# Patient Record
Sex: MALE | Race: WHITE | NOT HISPANIC OR LATINO | Employment: FULL TIME | URBAN - METROPOLITAN AREA
[De-identification: names, ages, dates, MRNs, and addresses within clinical notes are randomized per-mention and may not be internally consistent; named-entity substitution may affect disease eponyms.]

---

## 2021-01-26 ENCOUNTER — OFFICE VISIT (OUTPATIENT)
Dept: NEUROSURGERY | Facility: CLINIC | Age: 36
End: 2021-01-26

## 2021-01-26 VITALS
DIASTOLIC BLOOD PRESSURE: 106 MMHG | TEMPERATURE: 98.5 F | HEIGHT: 72 IN | BODY MASS INDEX: 35.76 KG/M2 | SYSTOLIC BLOOD PRESSURE: 136 MMHG | WEIGHT: 264 LBS

## 2021-01-26 DIAGNOSIS — M51.26 HERNIATED LUMBAR INTERVERTEBRAL DISC: Primary | ICD-10-CM

## 2021-01-26 PROCEDURE — 99203 OFFICE O/P NEW LOW 30 MIN: CPT | Performed by: NEUROLOGICAL SURGERY

## 2021-01-26 RX ORDER — NABUMETONE 750 MG/1
750 TABLET, FILM COATED ORAL 2 TIMES DAILY
Qty: 60 TABLET | Refills: 0 | Status: SHIPPED | OUTPATIENT
Start: 2021-01-26 | End: 2021-03-06 | Stop reason: HOSPADM

## 2021-01-26 RX ORDER — CYCLOBENZAPRINE HCL 10 MG
10 TABLET ORAL 2 TIMES DAILY PRN
COMMUNITY
End: 2021-03-06 | Stop reason: HOSPADM

## 2021-01-26 RX ORDER — METHOCARBAMOL 750 MG/1
750 TABLET, FILM COATED ORAL NIGHTLY
Qty: 30 TABLET | Refills: 1 | Status: SHIPPED | OUTPATIENT
Start: 2021-01-26 | End: 2021-02-25

## 2021-01-26 NOTE — PATIENT INSTRUCTIONS
Call Dr. Garcia on a Monday or Tuesday (ask for Nereida or Celina) and leave a message.     Dr. Garcia will call you back at the end of the day as soon as he can.     [phone] Nereida    [phone] Celina Abdi    Call 3-4 weeks

## 2021-02-15 ENCOUNTER — TELEPHONE (OUTPATIENT)
Dept: NEUROSURGERY | Facility: CLINIC | Age: 36
End: 2021-02-15

## 2021-02-15 ENCOUNTER — PREP FOR SURGERY (OUTPATIENT)
Dept: OTHER | Facility: HOSPITAL | Age: 36
End: 2021-02-15

## 2021-02-15 DIAGNOSIS — M51.26 HERNIATED LUMBAR INTERVERTEBRAL DISC: Primary | ICD-10-CM

## 2021-02-15 RX ORDER — HYDROCODONE BITARTRATE AND ACETAMINOPHEN 7.5; 325 MG/1; MG/1
1 TABLET ORAL ONCE
Status: CANCELLED | OUTPATIENT
Start: 2021-02-15 | End: 2021-02-15

## 2021-02-15 RX ORDER — CEFAZOLIN SODIUM 2 G/100ML
2 INJECTION, SOLUTION INTRAVENOUS ONCE
Status: CANCELLED | OUTPATIENT
Start: 2021-02-15 | End: 2021-02-15

## 2021-02-15 RX ORDER — SODIUM CHLORIDE 0.9 % (FLUSH) 0.9 %
3-10 SYRINGE (ML) INJECTION AS NEEDED
Status: CANCELLED | OUTPATIENT
Start: 2021-02-15

## 2021-02-15 RX ORDER — IBUPROFEN 800 MG/1
800 TABLET ORAL ONCE
Status: CANCELLED | OUTPATIENT
Start: 2021-02-15 | End: 2021-02-15

## 2021-02-15 RX ORDER — ACETAMINOPHEN 325 MG/1
650 TABLET ORAL ONCE
Status: CANCELLED | OUTPATIENT
Start: 2021-02-15 | End: 2021-02-15

## 2021-02-15 RX ORDER — SODIUM CHLORIDE, SODIUM LACTATE, POTASSIUM CHLORIDE, CALCIUM CHLORIDE 600; 310; 30; 20 MG/100ML; MG/100ML; MG/100ML; MG/100ML
100 INJECTION, SOLUTION INTRAVENOUS CONTINUOUS
Status: CANCELLED | OUTPATIENT
Start: 2021-02-15

## 2021-02-15 RX ORDER — SODIUM CHLORIDE 0.9 % (FLUSH) 0.9 %
3 SYRINGE (ML) INJECTION EVERY 12 HOURS SCHEDULED
Status: CANCELLED | OUTPATIENT
Start: 2021-02-15

## 2021-02-15 NOTE — H&P
Chief Complaint   Patient presents with   • Back Pain   • Leg Pain       RLE         HISTORY OF PRESENT ILLNESS: This is a 35-year-old male seen with approximately 5-week history of pain in his back which radiates to his right leg subsequent playing basketball i on January 20, 2001.  He has shown no improvement since that time.  The pain is worse with bending and squatting.  He has a genetic predisposition for degenerative disc disease.  Lumbar MRI has been performed.     Medical History   History reviewed. No pertinent past medical history.        Surgical History         Past Surgical History:   Procedure Laterality Date   • ROTATOR CUFF REPAIR Right 1997                 Family History   Problem Relation Age of Onset   • Arthritis Mother           Social History   Social History            Socioeconomic History   • Marital status:        Spouse name: Not on file   • Number of children: Not on file   • Years of education: Not on file   • Highest education level: Not on file   Tobacco Use   • Smoking status: Never Smoker   • Smokeless tobacco: Never Used   Substance and Sexual Activity   • Alcohol use: Yes       Alcohol/week: 10.0 - 15.0 standard drinks       Types: 10 - 15 Cans of beer per week   • Drug use: Never   • Sexual activity: Defer           No Known Allergies        Current Outpatient Medications:   •  cyclobenzaprine (FLEXERIL) 10 MG tablet, Take 10 mg by mouth 2 (Two) Times a Day As Needed for Muscle Spasms., Disp: , Rfl:      Review of Systems   Constitutional: Negative for activity change, appetite change, chills, diaphoresis, fatigue, fever and unexpected weight change.   HENT: Negative for congestion, dental problem, drooling, ear discharge, ear pain, facial swelling, hearing loss, mouth sores, nosebleeds, postnasal drip, rhinorrhea, sinus pressure, sneezing, sore throat, tinnitus, trouble swallowing and voice change.    Eyes: Negative for photophobia, pain, discharge, redness, itching and  "visual disturbance.   Respiratory: Negative for apnea, cough, choking, chest tightness, shortness of breath, wheezing and stridor.    Cardiovascular: Negative for chest pain, palpitations and leg swelling.   Gastrointestinal: Negative for abdominal distention, abdominal pain, anal bleeding, blood in stool, constipation, diarrhea, nausea, rectal pain and vomiting.   Endocrine: Negative for cold intolerance, heat intolerance, polydipsia, polyphagia and polyuria.   Genitourinary: Negative for decreased urine volume, difficulty urinating, dysuria, enuresis, flank pain, frequency, genital sores, hematuria and urgency.   Musculoskeletal: Positive for back pain. Negative for arthralgias, gait problem, joint swelling, myalgias, neck pain and neck stiffness.   Skin: Negative for color change, pallor, rash and wound.   Allergic/Immunologic: Negative for environmental allergies, food allergies and immunocompromised state.   Neurological: Negative for dizziness, tremors, seizures, syncope, facial asymmetry, speech difficulty, weakness, light-headedness, numbness and headaches.   Hematological: Negative for adenopathy. Does not bruise/bleed easily.   Psychiatric/Behavioral: Negative for agitation, behavioral problems, confusion, decreased concentration, dysphoric mood, hallucinations, self-injury, sleep disturbance and suicidal ideas. The patient is not nervous/anxious and is not hyperactive.    All other systems reviewed and are negative.        Vitals       Vitals:     01/26/21 1224   BP: (!) 136/106   BP Location: Right arm   Patient Position: Sitting   Cuff Size: Adult   Temp: 98.5 °F (36.9 °C)   TempSrc: Infrared   Weight: 120 kg (264 lb)   Height: 182.9 cm (72\")           Neurological Examination:     Mental status/speech: The patient is alert and oriented.  Speech is clear without aphysia or dysarthria.  No overt cognitive deficits.     Cranial nerve examination:     Olfaction: Smell is intact.  Vision: Vision is intact " without visual field abnormalities.  Funduscopic examination is normal.  No pupillary irregularity.  Ocular motor examination: The extraocular muscles are intact.  There is no diplopia.  The pupil is round and reactive to both light and accommodation.  There is no nystagmus.  Facial movement/sensation: There is no facial weakness.  Sensation is intact in the first, second, and third divisions of the trigeminal nerve.  The corneal reflex is intact.  Auditory: Hearing is intact to finger rub bilaterally.  Cranial nerves IX, X, XI, XII: Phonation is normal.  No dysphagia.  Tongue is protruded in the midline without atrophy.  The gag reflex is intact.  Shoulder shrug is normal.     Musculoligamentous ligamentous examination: BMI 35.8, weight 264.  Limited range of motion lumbar spine.  Positive straight leg raise on the right side.  No weakness, sensory loss or reflex asymmetry.  Gait is normal.  No Babinski Aleshia or clonus     Medical Decision Making:                 Diagnostic Data Set: Lumbar MRI data set show the presence of degenerative disc disease L4-5 and L5-S1.  At L5-S1 he has disc protrusion deviating to the right providing anatomical/clinical correlation.                             Assessment: Symptomatic disc herniation L5-S1, right                                                                            Recommendations: I have sent him to physical therapy, given him prescription of Relafen 750 mg twice daily and Robaxin-750 milligrams at night and he will call me the next 3-4 weeks.  At that time if he is not better either epidural steroid injection or disc excision would be indicated.  He is unable to work at this time.  I will keep you informed and thank you for allow me to see him

## 2021-02-15 NOTE — TELEPHONE ENCOUNTER
----- Message from Nereida Pablo sent at 2/15/2021 12:24 PM EST -----  Completed 3 weeks of PT. This has made things worse. He does not have any improvement.    [phone]

## 2021-02-22 PROBLEM — M51.26 HERNIATED LUMBAR INTERVERTEBRAL DISC: Status: ACTIVE | Noted: 2021-02-22

## 2021-03-01 ENCOUNTER — APPOINTMENT (OUTPATIENT)
Dept: PREADMISSION TESTING | Facility: HOSPITAL | Age: 36
End: 2021-03-01

## 2021-03-03 ENCOUNTER — APPOINTMENT (OUTPATIENT)
Dept: PREADMISSION TESTING | Facility: HOSPITAL | Age: 36
End: 2021-03-03

## 2021-03-03 VITALS — BODY MASS INDEX: 36.73 KG/M2 | HEIGHT: 72 IN | WEIGHT: 271.2 LBS

## 2021-03-03 DIAGNOSIS — M51.26 HERNIATED LUMBAR INTERVERTEBRAL DISC: ICD-10-CM

## 2021-03-03 LAB
ALBUMIN SERPL-MCNC: 4.7 G/DL (ref 3.5–5.2)
ALBUMIN/GLOB SERPL: 2.4 G/DL
ALP SERPL-CCNC: 79 U/L (ref 39–117)
ALT SERPL W P-5'-P-CCNC: 48 U/L (ref 1–41)
ANION GAP SERPL CALCULATED.3IONS-SCNC: 8 MMOL/L (ref 5–15)
AST SERPL-CCNC: 26 U/L (ref 1–40)
BILIRUB SERPL-MCNC: 0.5 MG/DL (ref 0–1.2)
BILIRUB UR QL STRIP: NEGATIVE
BUN SERPL-MCNC: 12 MG/DL (ref 6–20)
BUN/CREAT SERPL: 12 (ref 7–25)
CALCIUM SPEC-SCNC: 9.5 MG/DL (ref 8.6–10.5)
CHLORIDE SERPL-SCNC: 104 MMOL/L (ref 98–107)
CLARITY UR: CLEAR
CO2 SERPL-SCNC: 29 MMOL/L (ref 22–29)
COLOR UR: YELLOW
CREAT SERPL-MCNC: 1 MG/DL (ref 0.76–1.27)
DEPRECATED RDW RBC AUTO: 35.6 FL (ref 37–54)
ERYTHROCYTE [DISTWIDTH] IN BLOOD BY AUTOMATED COUNT: 11.6 % (ref 12.3–15.4)
GFR SERPL CREATININE-BSD FRML MDRD: 85 ML/MIN/1.73
GLOBULIN UR ELPH-MCNC: 2 GM/DL
GLUCOSE SERPL-MCNC: 99 MG/DL (ref 65–99)
GLUCOSE UR STRIP-MCNC: NEGATIVE MG/DL
HCT VFR BLD AUTO: 43.2 % (ref 37.5–51)
HGB BLD-MCNC: 15.3 G/DL (ref 13–17.7)
HGB UR QL STRIP.AUTO: NEGATIVE
KETONES UR QL STRIP: NEGATIVE
LEUKOCYTE ESTERASE UR QL STRIP.AUTO: NEGATIVE
MCH RBC QN AUTO: 30.1 PG (ref 26.6–33)
MCHC RBC AUTO-ENTMCNC: 35.4 G/DL (ref 31.5–35.7)
MCV RBC AUTO: 85 FL (ref 79–97)
MRSA DNA SPEC QL NAA+PROBE: NEGATIVE
NITRITE UR QL STRIP: NEGATIVE
PH UR STRIP.AUTO: 5.5 [PH] (ref 5–8)
PLATELET # BLD AUTO: 208 10*3/MM3 (ref 140–450)
PMV BLD AUTO: 9.7 FL (ref 6–12)
POTASSIUM SERPL-SCNC: 4.4 MMOL/L (ref 3.5–5.2)
PROT SERPL-MCNC: 6.7 G/DL (ref 6–8.5)
PROT UR QL STRIP: NEGATIVE
RBC # BLD AUTO: 5.08 10*6/MM3 (ref 4.14–5.8)
SODIUM SERPL-SCNC: 141 MMOL/L (ref 136–145)
SP GR UR STRIP: 1.02 (ref 1–1.03)
UROBILINOGEN UR QL STRIP: NORMAL
WBC # BLD AUTO: 5.47 10*3/MM3 (ref 3.4–10.8)

## 2021-03-03 PROCEDURE — 36415 COLL VENOUS BLD VENIPUNCTURE: CPT

## 2021-03-03 PROCEDURE — 87641 MR-STAPH DNA AMP PROBE: CPT

## 2021-03-03 PROCEDURE — 85027 COMPLETE CBC AUTOMATED: CPT

## 2021-03-03 PROCEDURE — 81003 URINALYSIS AUTO W/O SCOPE: CPT

## 2021-03-03 PROCEDURE — U0004 COV-19 TEST NON-CDC HGH THRU: HCPCS

## 2021-03-03 PROCEDURE — C9803 HOPD COVID-19 SPEC COLLECT: HCPCS

## 2021-03-03 PROCEDURE — 80053 COMPREHEN METABOLIC PANEL: CPT

## 2021-03-03 NOTE — PAT
Bactroban and Chlorhexidine Prescription given during PAT visit, as well as written and verbal instructions given to patient during PAT visit.  Patient/family also instructed to complete skin prep checklist and return the checklist on the day of surgery to preoperative staff.  Patient/family verbalized understanding.    Patient to apply Chlorhexadine wipes  to surgical area (as instructed) the night before procedure and the AM of procedure. Wipes provided.    Patient instructed to drink 20 ounces (or until full) of Gatorade and it needs to be completed 1 hour before given arrival time for procedure (NO RED Gatorade)    Patient verbalized understanding.

## 2021-03-04 LAB — SARS-COV-2 RNA RESP QL NAA+PROBE: NOT DETECTED

## 2021-03-05 ENCOUNTER — APPOINTMENT (OUTPATIENT)
Dept: GENERAL RADIOLOGY | Facility: HOSPITAL | Age: 36
End: 2021-03-05

## 2021-03-05 ENCOUNTER — HOSPITAL ENCOUNTER (OUTPATIENT)
Facility: HOSPITAL | Age: 36
Discharge: HOME | End: 2021-03-06
Attending: NEUROLOGICAL SURGERY | Admitting: NEUROLOGICAL SURGERY

## 2021-03-05 ENCOUNTER — ANESTHESIA EVENT (OUTPATIENT)
Dept: PERIOP | Facility: HOSPITAL | Age: 36
End: 2021-03-05

## 2021-03-05 ENCOUNTER — ANESTHESIA (OUTPATIENT)
Dept: PERIOP | Facility: HOSPITAL | Age: 36
End: 2021-03-05

## 2021-03-05 DIAGNOSIS — M51.26 LUMBAR HERNIATED DISC: Primary | ICD-10-CM

## 2021-03-05 DIAGNOSIS — M51.26 HERNIATED LUMBAR INTERVERTEBRAL DISC: ICD-10-CM

## 2021-03-05 PROCEDURE — 25010000003 CEFAZOLIN IN DEXTROSE 2-4 GM/100ML-% SOLUTION: Performed by: NEUROLOGICAL SURGERY

## 2021-03-05 PROCEDURE — G0378 HOSPITAL OBSERVATION PER HR: HCPCS

## 2021-03-05 PROCEDURE — 25010000002 DEXAMETHASONE: Performed by: NEUROLOGICAL SURGERY

## 2021-03-05 PROCEDURE — 25010000002 DEXAMETHASONE PER 1 MG: Performed by: NEUROLOGICAL SURGERY

## 2021-03-05 PROCEDURE — 25010000003 BUPIVACAINE LIPOSOME 1.3 % SUSPENSION: Performed by: NEUROLOGICAL SURGERY

## 2021-03-05 PROCEDURE — 25010000002 ONDANSETRON PER 1 MG: Performed by: NURSE ANESTHETIST, CERTIFIED REGISTERED

## 2021-03-05 PROCEDURE — 72020 X-RAY EXAM OF SPINE 1 VIEW: CPT

## 2021-03-05 PROCEDURE — C9290 INJ, BUPIVACAINE LIPOSOME: HCPCS | Performed by: NEUROLOGICAL SURGERY

## 2021-03-05 PROCEDURE — 63710000001 DEXAMETHASONE PER 0.25 MG: Performed by: NEUROLOGICAL SURGERY

## 2021-03-05 PROCEDURE — 63030 LAMOT DCMPRN NRV RT 1 LMBR: CPT | Performed by: NEUROLOGICAL SURGERY

## 2021-03-05 PROCEDURE — 25010000003 POTASSIUM CHLORIDE PER 2 MEQ: Performed by: NEUROLOGICAL SURGERY

## 2021-03-05 PROCEDURE — 25010000002 PROPOFOL 10 MG/ML EMULSION: Performed by: NURSE ANESTHETIST, CERTIFIED REGISTERED

## 2021-03-05 PROCEDURE — 25010000002 FENTANYL CITRATE (PF) 100 MCG/2ML SOLUTION: Performed by: ANESTHESIOLOGY

## 2021-03-05 PROCEDURE — 63030 LAMOT DCMPRN NRV RT 1 LMBR: CPT | Performed by: PHYSICIAN ASSISTANT

## 2021-03-05 PROCEDURE — 25010000002 HYDROMORPHONE PER 4 MG: Performed by: ANESTHESIOLOGY

## 2021-03-05 PROCEDURE — 25010000002 CEFAZOLIN PER 500 MG: Performed by: NEUROLOGICAL SURGERY

## 2021-03-05 DEVICE — HEMOST ABS SURGIFOAM SZ100 8X12 10MM: Type: IMPLANTABLE DEVICE | Site: SPINE LUMBAR | Status: FUNCTIONAL

## 2021-03-05 DEVICE — FLOSEAL HEMOSTATIC MATRIX, 10ML
Type: IMPLANTABLE DEVICE | Site: SPINE LUMBAR | Status: FUNCTIONAL
Brand: FLOSEAL HEMOSTATIC MATRIX

## 2021-03-05 RX ORDER — TRAMADOL HYDROCHLORIDE 50 MG/1
50 TABLET ORAL EVERY 4 HOURS PRN
Status: DISCONTINUED | OUTPATIENT
Start: 2021-03-05 | End: 2021-03-06 | Stop reason: HOSPADM

## 2021-03-05 RX ORDER — DEXAMETHASONE SODIUM PHOSPHATE 4 MG/ML
4 INJECTION, SOLUTION INTRA-ARTICULAR; INTRALESIONAL; INTRAMUSCULAR; INTRAVENOUS; SOFT TISSUE EVERY 6 HOURS SCHEDULED
Status: DISCONTINUED | OUTPATIENT
Start: 2021-03-05 | End: 2021-03-06

## 2021-03-05 RX ORDER — ALPRAZOLAM 0.5 MG/1
0.5 TABLET ORAL 3 TIMES DAILY PRN
Status: DISCONTINUED | OUTPATIENT
Start: 2021-03-05 | End: 2021-03-06 | Stop reason: HOSPADM

## 2021-03-05 RX ORDER — SODIUM CHLORIDE AND POTASSIUM CHLORIDE 150; 450 MG/100ML; MG/100ML
75 INJECTION, SOLUTION INTRAVENOUS CONTINUOUS
Status: DISCONTINUED | OUTPATIENT
Start: 2021-03-05 | End: 2021-03-06 | Stop reason: HOSPADM

## 2021-03-05 RX ORDER — LIDOCAINE HYDROCHLORIDE 10 MG/ML
0.5 INJECTION, SOLUTION EPIDURAL; INFILTRATION; INTRACAUDAL; PERINEURAL ONCE AS NEEDED
Status: COMPLETED | OUTPATIENT
Start: 2021-03-05 | End: 2021-03-05

## 2021-03-05 RX ORDER — MAGNESIUM HYDROXIDE 1200 MG/15ML
LIQUID ORAL AS NEEDED
Status: DISCONTINUED | OUTPATIENT
Start: 2021-03-05 | End: 2021-03-05 | Stop reason: HOSPADM

## 2021-03-05 RX ORDER — BACLOFEN 10 MG/1
10 TABLET ORAL EVERY 8 HOURS SCHEDULED
Status: DISCONTINUED | OUTPATIENT
Start: 2021-03-05 | End: 2021-03-06 | Stop reason: HOSPADM

## 2021-03-05 RX ORDER — FAMOTIDINE 10 MG/ML
20 INJECTION, SOLUTION INTRAVENOUS ONCE
Status: CANCELLED | OUTPATIENT
Start: 2021-03-05 | End: 2021-03-05

## 2021-03-05 RX ORDER — SODIUM CHLORIDE, SODIUM LACTATE, POTASSIUM CHLORIDE, CALCIUM CHLORIDE 600; 310; 30; 20 MG/100ML; MG/100ML; MG/100ML; MG/100ML
9 INJECTION, SOLUTION INTRAVENOUS CONTINUOUS
Status: DISCONTINUED | OUTPATIENT
Start: 2021-03-05 | End: 2021-03-06 | Stop reason: HOSPADM

## 2021-03-05 RX ORDER — ROCURONIUM BROMIDE 10 MG/ML
INJECTION, SOLUTION INTRAVENOUS AS NEEDED
Status: DISCONTINUED | OUTPATIENT
Start: 2021-03-05 | End: 2021-03-05 | Stop reason: SURG

## 2021-03-05 RX ORDER — HYDROCODONE BITARTRATE AND ACETAMINOPHEN 7.5; 325 MG/1; MG/1
1 TABLET ORAL ONCE
Status: COMPLETED | OUTPATIENT
Start: 2021-03-05 | End: 2021-03-05

## 2021-03-05 RX ORDER — DEXAMETHASONE 4 MG/1
4 TABLET ORAL EVERY 6 HOURS SCHEDULED
Status: DISCONTINUED | OUTPATIENT
Start: 2021-03-05 | End: 2021-03-06

## 2021-03-05 RX ORDER — MIDAZOLAM HYDROCHLORIDE 1 MG/ML
1 INJECTION INTRAMUSCULAR; INTRAVENOUS
Status: DISCONTINUED | OUTPATIENT
Start: 2021-03-05 | End: 2021-03-05 | Stop reason: HOSPADM

## 2021-03-05 RX ORDER — SODIUM CHLORIDE 0.9 % (FLUSH) 0.9 %
10 SYRINGE (ML) INJECTION AS NEEDED
Status: DISCONTINUED | OUTPATIENT
Start: 2021-03-05 | End: 2021-03-06 | Stop reason: HOSPADM

## 2021-03-05 RX ORDER — IBUPROFEN 800 MG/1
800 TABLET ORAL ONCE
Status: COMPLETED | OUTPATIENT
Start: 2021-03-05 | End: 2021-03-05

## 2021-03-05 RX ORDER — CEFAZOLIN SODIUM 2 G/100ML
2 INJECTION, SOLUTION INTRAVENOUS ONCE
Status: COMPLETED | OUTPATIENT
Start: 2021-03-05 | End: 2021-03-05

## 2021-03-05 RX ORDER — FAMOTIDINE 20 MG/1
20 TABLET, FILM COATED ORAL ONCE
Status: COMPLETED | OUTPATIENT
Start: 2021-03-05 | End: 2021-03-05

## 2021-03-05 RX ORDER — OXYCODONE AND ACETAMINOPHEN 7.5; 325 MG/1; MG/1
2 TABLET ORAL EVERY 4 HOURS PRN
Status: DISCONTINUED | OUTPATIENT
Start: 2021-03-05 | End: 2021-03-06 | Stop reason: HOSPADM

## 2021-03-05 RX ORDER — SODIUM CHLORIDE 0.9 % (FLUSH) 0.9 %
10 SYRINGE (ML) INJECTION AS NEEDED
Status: CANCELLED | OUTPATIENT
Start: 2021-03-05

## 2021-03-05 RX ORDER — PROMETHAZINE HYDROCHLORIDE 25 MG/1
12.5 TABLET ORAL EVERY 6 HOURS PRN
Status: DISCONTINUED | OUTPATIENT
Start: 2021-03-05 | End: 2021-03-06 | Stop reason: HOSPADM

## 2021-03-05 RX ORDER — HYDROMORPHONE HYDROCHLORIDE 1 MG/ML
0.5 INJECTION, SOLUTION INTRAMUSCULAR; INTRAVENOUS; SUBCUTANEOUS
Status: DISCONTINUED | OUTPATIENT
Start: 2021-03-05 | End: 2021-03-05 | Stop reason: HOSPADM

## 2021-03-05 RX ORDER — ACETAMINOPHEN 325 MG/1
650 TABLET ORAL ONCE
Status: COMPLETED | OUTPATIENT
Start: 2021-03-05 | End: 2021-03-05

## 2021-03-05 RX ORDER — TEMAZEPAM 15 MG/1
30 CAPSULE ORAL NIGHTLY PRN
Status: DISCONTINUED | OUTPATIENT
Start: 2021-03-05 | End: 2021-03-06 | Stop reason: HOSPADM

## 2021-03-05 RX ORDER — SODIUM CHLORIDE 9 MG/ML
INJECTION, SOLUTION INTRAVENOUS AS NEEDED
Status: DISCONTINUED | OUTPATIENT
Start: 2021-03-05 | End: 2021-03-05 | Stop reason: HOSPADM

## 2021-03-05 RX ORDER — LIDOCAINE HYDROCHLORIDE 10 MG/ML
INJECTION, SOLUTION EPIDURAL; INFILTRATION; INTRACAUDAL; PERINEURAL AS NEEDED
Status: DISCONTINUED | OUTPATIENT
Start: 2021-03-05 | End: 2021-03-05 | Stop reason: SURG

## 2021-03-05 RX ORDER — ONDANSETRON 2 MG/ML
INJECTION INTRAMUSCULAR; INTRAVENOUS AS NEEDED
Status: DISCONTINUED | OUTPATIENT
Start: 2021-03-05 | End: 2021-03-05 | Stop reason: SURG

## 2021-03-05 RX ORDER — SODIUM CHLORIDE 0.9 % (FLUSH) 0.9 %
3 SYRINGE (ML) INJECTION EVERY 12 HOURS SCHEDULED
Status: DISCONTINUED | OUTPATIENT
Start: 2021-03-05 | End: 2021-03-06 | Stop reason: HOSPADM

## 2021-03-05 RX ORDER — HYDROMORPHONE HYDROCHLORIDE 1 MG/ML
0.5 INJECTION, SOLUTION INTRAMUSCULAR; INTRAVENOUS; SUBCUTANEOUS
Status: DISCONTINUED | OUTPATIENT
Start: 2021-03-05 | End: 2021-03-06 | Stop reason: HOSPADM

## 2021-03-05 RX ORDER — ACETAMINOPHEN 500 MG
500 TABLET ORAL EVERY 6 HOURS
Status: DISCONTINUED | OUTPATIENT
Start: 2021-03-05 | End: 2021-03-06 | Stop reason: HOSPADM

## 2021-03-05 RX ORDER — PROPOFOL 10 MG/ML
VIAL (ML) INTRAVENOUS AS NEEDED
Status: DISCONTINUED | OUTPATIENT
Start: 2021-03-05 | End: 2021-03-05 | Stop reason: SURG

## 2021-03-05 RX ORDER — CEFAZOLIN SODIUM 2 G/100ML
2 INJECTION, SOLUTION INTRAVENOUS EVERY 8 HOURS
Status: COMPLETED | OUTPATIENT
Start: 2021-03-05 | End: 2021-03-06

## 2021-03-05 RX ORDER — FENTANYL CITRATE 50 UG/ML
50 INJECTION, SOLUTION INTRAMUSCULAR; INTRAVENOUS
Status: DISCONTINUED | OUTPATIENT
Start: 2021-03-05 | End: 2021-03-05 | Stop reason: HOSPADM

## 2021-03-05 RX ORDER — MIDAZOLAM HYDROCHLORIDE 1 MG/ML
2 INJECTION INTRAMUSCULAR; INTRAVENOUS
Status: DISCONTINUED | OUTPATIENT
Start: 2021-03-05 | End: 2021-03-05 | Stop reason: HOSPADM

## 2021-03-05 RX ORDER — NALOXONE HCL 0.4 MG/ML
0.4 VIAL (ML) INJECTION
Status: DISCONTINUED | OUTPATIENT
Start: 2021-03-05 | End: 2021-03-06 | Stop reason: HOSPADM

## 2021-03-05 RX ORDER — SODIUM CHLORIDE 0.9 % (FLUSH) 0.9 %
10 SYRINGE (ML) INJECTION EVERY 12 HOURS SCHEDULED
Status: CANCELLED | OUTPATIENT
Start: 2021-03-05

## 2021-03-05 RX ORDER — PROMETHAZINE HYDROCHLORIDE 25 MG/1
12.5 SUPPOSITORY RECTAL EVERY 6 HOURS PRN
Status: DISCONTINUED | OUTPATIENT
Start: 2021-03-05 | End: 2021-03-06 | Stop reason: HOSPADM

## 2021-03-05 RX ADMIN — FENTANYL CITRATE 50 MCG: 50 INJECTION, SOLUTION INTRAMUSCULAR; INTRAVENOUS at 14:10

## 2021-03-05 RX ADMIN — ACETAMINOPHEN 650 MG: 325 TABLET ORAL at 09:34

## 2021-03-05 RX ADMIN — LIDOCAINE HYDROCHLORIDE 50 MG: 10 INJECTION, SOLUTION EPIDURAL; INFILTRATION; INTRACAUDAL; PERINEURAL at 11:07

## 2021-03-05 RX ADMIN — BACLOFEN 10 MG: 10 TABLET ORAL at 15:10

## 2021-03-05 RX ADMIN — CEFAZOLIN SODIUM 2 G: 2 INJECTION, SOLUTION INTRAVENOUS at 11:03

## 2021-03-05 RX ADMIN — ACETAMINOPHEN 500 MG: 500 TABLET ORAL at 17:56

## 2021-03-05 RX ADMIN — HYDROMORPHONE HYDROCHLORIDE 0.5 MG: 1 INJECTION, SOLUTION INTRAMUSCULAR; INTRAVENOUS; SUBCUTANEOUS at 14:20

## 2021-03-05 RX ADMIN — ROCURONIUM BROMIDE 20 MG: 10 INJECTION INTRAVENOUS at 12:22

## 2021-03-05 RX ADMIN — HYDROMORPHONE HYDROCHLORIDE 0.5 MG: 1 INJECTION, SOLUTION INTRAMUSCULAR; INTRAVENOUS; SUBCUTANEOUS at 13:50

## 2021-03-05 RX ADMIN — SODIUM CHLORIDE, POTASSIUM CHLORIDE, SODIUM LACTATE AND CALCIUM CHLORIDE: 600; 310; 30; 20 INJECTION, SOLUTION INTRAVENOUS at 11:45

## 2021-03-05 RX ADMIN — ROCURONIUM BROMIDE 30 MG: 10 INJECTION INTRAVENOUS at 11:48

## 2021-03-05 RX ADMIN — LIDOCAINE HYDROCHLORIDE 0.2 ML: 10 INJECTION, SOLUTION EPIDURAL; INFILTRATION; INTRACAUDAL; PERINEURAL at 09:20

## 2021-03-05 RX ADMIN — DEXAMETHASONE SODIUM PHOSPHATE 10 MG: 10 INJECTION INTRAMUSCULAR; INTRAVENOUS at 11:12

## 2021-03-05 RX ADMIN — ONDANSETRON 4 MG: 2 INJECTION INTRAMUSCULAR; INTRAVENOUS at 12:57

## 2021-03-05 RX ADMIN — ROCURONIUM BROMIDE 50 MG: 10 INJECTION INTRAVENOUS at 11:07

## 2021-03-05 RX ADMIN — BACLOFEN 10 MG: 10 TABLET ORAL at 21:03

## 2021-03-05 RX ADMIN — FENTANYL CITRATE 50 MCG: 50 INJECTION, SOLUTION INTRAMUSCULAR; INTRAVENOUS at 14:00

## 2021-03-05 RX ADMIN — OXYCODONE HYDROCHLORIDE AND ACETAMINOPHEN 2 TABLET: 7.5; 325 TABLET ORAL at 15:10

## 2021-03-05 RX ADMIN — ROCURONIUM BROMIDE 20 MG: 10 INJECTION INTRAVENOUS at 11:40

## 2021-03-05 RX ADMIN — SODIUM CHLORIDE, POTASSIUM CHLORIDE, SODIUM LACTATE AND CALCIUM CHLORIDE 9 ML/HR: 600; 310; 30; 20 INJECTION, SOLUTION INTRAVENOUS at 09:20

## 2021-03-05 RX ADMIN — SUGAMMADEX 400 MG: 100 INJECTION, SOLUTION INTRAVENOUS at 13:14

## 2021-03-05 RX ADMIN — TRAMADOL HYDROCHLORIDE 50 MG: 50 TABLET, FILM COATED ORAL at 21:06

## 2021-03-05 RX ADMIN — DEXAMETHASONE 4 MG: 4 TABLET ORAL at 23:20

## 2021-03-05 RX ADMIN — POTASSIUM CHLORIDE AND SODIUM CHLORIDE 75 ML/HR: 450; 150 INJECTION, SOLUTION INTRAVENOUS at 15:10

## 2021-03-05 RX ADMIN — HYDROCODONE BITARTRATE AND ACETAMINOPHEN 1 TABLET: 7.5; 325 TABLET ORAL at 09:34

## 2021-03-05 RX ADMIN — PROPOFOL 200 MG: 10 INJECTION, EMULSION INTRAVENOUS at 11:07

## 2021-03-05 RX ADMIN — ROCURONIUM BROMIDE 20 MG: 10 INJECTION INTRAVENOUS at 12:05

## 2021-03-05 RX ADMIN — CEFAZOLIN 2 G: 10 INJECTION, POWDER, FOR SOLUTION INTRAVENOUS at 17:56

## 2021-03-05 RX ADMIN — ACETAMINOPHEN 500 MG: 500 TABLET ORAL at 23:20

## 2021-03-05 RX ADMIN — DEXAMETHASONE SODIUM PHOSPHATE 4 MG: 4 INJECTION, SOLUTION INTRA-ARTICULAR; INTRALESIONAL; INTRAMUSCULAR; INTRAVENOUS; SOFT TISSUE at 17:56

## 2021-03-05 RX ADMIN — FAMOTIDINE 20 MG: 20 TABLET ORAL at 09:34

## 2021-03-05 RX ADMIN — IBUPROFEN 800 MG: 800 TABLET, FILM COATED ORAL at 09:34

## 2021-03-05 NOTE — ANESTHESIA POSTPROCEDURE EVALUATION
Patient: Yemi Herrera    Procedure Summary     Date: 03/05/21 Room / Location:  [facility]    Anesthesia Start: 1102 Anesthesia Stop:     Procedure: LUMBAR DISCECTOMY L5-S1 (Right Spine Lumbar) Diagnosis:       Herniated lumbar intervertebral disc      (Herniated lumbar intervertebral disc [M51.26])    Surgeon: Tristan Garcia MD Provider: Gretel Lucero MD    Anesthesia Type: general ASA Status: 2          Anesthesia Type: general    Vitals  Vitals Value Taken Time   BP     Temp     Pulse     Resp     SpO2 96 % 03/05/21 1328   Vitals shown include unvalidated device data.        Post Anesthesia Care and Evaluation    Patient location during evaluation: PACU  Patient participation: complete - patient participated  Level of consciousness: awake and alert  Pain score: 0  Pain management: adequate  Airway patency: patent  Anesthetic complications: No anesthetic complications  PONV Status: none  Cardiovascular status: hemodynamically stable and acceptable  Respiratory status: nonlabored ventilation, acceptable and nasal cannula  Hydration status: acceptable

## 2021-03-05 NOTE — ANESTHESIA PROCEDURE NOTES
Airway  Urgency: elective    Date/Time: 3/5/2021 11:09 AM  Airway not difficult    General Information and Staff    Patient location during procedure: OR  CRNA: Dylan Anton CRNA    Indications and Patient Condition  Indications for airway management: airway protection    Preoxygenated: yes  MILS not maintained throughout  Mask difficulty assessment: 1 - vent by mask    Final Airway Details  Final airway type: endotracheal airway      Successful airway: ETT  Cuffed: yes   Successful intubation technique: direct laryngoscopy  Endotracheal tube insertion site: oral  Blade: Cruz  Blade size: 2  ETT size (mm): 7.5  Cormack-Lehane Classification: grade I - full view of glottis  Placement verified by: chest auscultation and capnometry   Cuff volume (mL): 5  Measured from: lips  ETT/EBT  to lips (cm): 23  Number of attempts at approach: 1  Assessment: lips, teeth, and gum same as pre-op and atraumatic intubation    Additional Comments  Negative epigastric sounds, Breath sound equal bilaterally with symmetric chest rise and fall

## 2021-03-05 NOTE — INTERVAL H&P NOTE
"[facility] Pre-op    Full history and physical note from office is attached.    /100 (BP Location: Right arm, Patient Position: Sitting)   Pulse 73   Temp 97.1 °F (36.2 °C) (Temporal)   Ht 182.9 cm (72\")   Wt 123 kg (271 lb)   SpO2 98%   BMI 36.75 kg/m²     Immunizations:  Influenza:  No  Pneumococcal:  No  Tetanus:  UTD  Covid x2: 2021    LAB Results:  Lab Results   Component Value Date    WBC 5.47 03/03/2021    HGB 15.3 03/03/2021    HCT 43.2 03/03/2021    MCV 85.0 03/03/2021     03/03/2021    GLUCOSE 99 03/03/2021    BUN 12 03/03/2021    CREATININE 1.00 03/03/2021    EGFRIFNONA 85 03/03/2021     03/03/2021    K 4.4 03/03/2021     03/03/2021    CO2 29.0 03/03/2021    CALCIUM 9.5 03/03/2021    ALBUMIN 4.70 03/03/2021    AST 26 03/03/2021    ALT 48 (H) 03/03/2021    BILITOT 0.5 03/03/2021       Cancer Staging (if applicable)  Cancer Patient: __ yes __no __unknown__N/A; If yes, clinical stage T:__ N:__M:__, stage group or __N/A      Impression: herniated lumbar intervertebral disc      Plan: LUMBAR DISCECTOMY L5-S1      Nadine Velez, THOMPSON   3/5/2021   09:40 EST  "

## 2021-03-05 NOTE — ANESTHESIA PREPROCEDURE EVALUATION
Anesthesia Evaluation     Patient summary reviewed and Nursing notes reviewed                Airway   Mallampati: I  TM distance: >3 FB  Neck ROM: full  No difficulty expected  Comment: PROMINENT UPPER INCISORS  Dental - normal exam     Pulmonary - negative pulmonary ROS and normal exam   Cardiovascular - negative cardio ROS and normal exam        Neuro/Psych- negative ROS  GI/Hepatic/Renal/Endo - negative ROS     Musculoskeletal     (+) back pain,   Abdominal  - normal exam    Bowel sounds: normal.   Substance History   (+) alcohol use,      OB/GYN negative ob/gyn ROS         Other                      Anesthesia Plan    ASA 2     general     intravenous induction     Anesthetic plan, all risks, benefits, and alternatives have been provided, discussed and informed consent has been obtained with: patient.    Plan discussed with CRNA.

## 2021-03-05 NOTE — PLAN OF CARE
Goal Outcome Evaluation:  Plan of Care Reviewed With: patient, mother  Progress: improving  Outcome Summary: discectomy L5-S1 right new admit 1441 PT OT consulted ambulated to bed from stretcher pt sleeping pain controlled with prn medication. waiting for pt to void

## 2021-03-06 VITALS
HEIGHT: 72 IN | HEART RATE: 70 BPM | OXYGEN SATURATION: 94 % | WEIGHT: 271 LBS | DIASTOLIC BLOOD PRESSURE: 67 MMHG | SYSTOLIC BLOOD PRESSURE: 117 MMHG | BODY MASS INDEX: 36.7 KG/M2 | TEMPERATURE: 97.6 F | RESPIRATION RATE: 18 BRPM

## 2021-03-06 PROCEDURE — 97165 OT EVAL LOW COMPLEX 30 MIN: CPT

## 2021-03-06 PROCEDURE — G0378 HOSPITAL OBSERVATION PER HR: HCPCS

## 2021-03-06 PROCEDURE — 97161 PT EVAL LOW COMPLEX 20 MIN: CPT

## 2021-03-06 PROCEDURE — 25010000003 CEFAZOLIN IN DEXTROSE 2-4 GM/100ML-% SOLUTION: Performed by: NEUROLOGICAL SURGERY

## 2021-03-06 PROCEDURE — 97535 SELF CARE MNGMENT TRAINING: CPT

## 2021-03-06 PROCEDURE — 63710000001 DEXAMETHASONE PER 0.25 MG: Performed by: NEUROLOGICAL SURGERY

## 2021-03-06 RX ORDER — TRAMADOL HYDROCHLORIDE 50 MG/1
50 TABLET ORAL EVERY 4 HOURS PRN
Qty: 60 TABLET | Refills: 0 | Status: SHIPPED | OUTPATIENT
Start: 2021-03-06 | End: 2021-04-19

## 2021-03-06 RX ORDER — BACLOFEN 10 MG/1
10 TABLET ORAL EVERY 8 HOURS SCHEDULED
Qty: 63 TABLET | Refills: 0 | Status: SHIPPED | OUTPATIENT
Start: 2021-03-06 | End: 2021-03-27

## 2021-03-06 RX ORDER — OXYCODONE AND ACETAMINOPHEN 7.5; 325 MG/1; MG/1
1 TABLET ORAL EVERY 6 HOURS PRN
Qty: 45 TABLET | Refills: 0 | Status: SHIPPED | OUTPATIENT
Start: 2021-03-06 | End: 2021-04-19

## 2021-03-06 RX ADMIN — CEFAZOLIN 2 G: 10 INJECTION, POWDER, FOR SOLUTION INTRAVENOUS at 02:00

## 2021-03-06 RX ADMIN — ACETAMINOPHEN 500 MG: 500 TABLET ORAL at 05:21

## 2021-03-06 RX ADMIN — DEXAMETHASONE 4 MG: 4 TABLET ORAL at 05:21

## 2021-03-06 RX ADMIN — BACLOFEN 10 MG: 10 TABLET ORAL at 05:20

## 2021-03-06 NOTE — PLAN OF CARE
Goal Outcome Evaluation:  Plan of Care Reviewed With: (P) patient, significant other  Progress: (P) improving  Outcome Summary: (P) Pt A&Ox4. OT educated pt on spinal precautions. OT educated pt on log rolling technique for bed mobility, and safe car transfers to maintain spinal precautions. Pt performed bed mobility and transfers with supervision. OT educated pt on AE incorporation into ADLs to maintain spinal precautions. OT educated pt on safe perineal care techniques to maintain spinal precautions during toileting. Pt performed UBD and LBD with supervision. Pt toileted with SBA. BSC not needed. Recommend d/c to home with assist.

## 2021-03-06 NOTE — DISCHARGE SUMMARY
Date of Discharge:  3/6/2021    Sweeney, Corinne A, PA    Discharge Diagnosis: Herniated intervertebral disc L5-S1, right    Procedures Performed  Procedure(s):  LUMBAR DISCECTOMY L5-S1       Summary of Hospitalization: This is a 35-year-old male seen with approximately 5-week history of pain in his back which radiates to his right leg subsequent playing basketball i on January 20, 2001.  He has shown no improvement since that time.  The pain is worse with bending and squatting.  He has a genetic predisposition for degenerative disc disease.  Lumbar MRI has been performed.    Diagnostic studies of the present disc herniation he was admitted for surgery.  This was performed with relief of his symptoms.  He is discharged to follow-up in 6 weeks.    Weight reduction has been advised.        Condition on Discharge: Satisfactory  Discharge Disposition  Home or Self Care    Discharge Medications     Discharge Medications      New Medications      Instructions Start Date   baclofen 10 MG tablet  Commonly known as: LIORESAL   10 mg, Oral, Every 8 Hours Scheduled      oxyCODONE-acetaminophen 7.5-325 MG per tablet  Commonly known as: PERCOCET   1 tablet, Oral, Every 6 Hours PRN      traMADol 50 MG tablet  Commonly known as: ULTRAM   50 mg, Oral, Every 4 Hours PRN         Stop These Medications    cyclobenzaprine 10 MG tablet  Commonly known as: FLEXERIL     nabumetone 750 MG tablet  Commonly known as: RELAFEN              Follow-up Appointments  No future appointments.  Additional Instructions for the Follow-ups that You Need to Schedule     Discharge Follow-up with Specialty: neuirosurgery; 6 Weeks   As directed      Specialty: neuirosurgery    Follow Up: 6 Weeks    Follow Up Details: post op follow up jes/philomena Garcia MD  03/06/21  08:23 EST

## 2021-03-06 NOTE — PLAN OF CARE
Goal Outcome Evaluation:  Plan of Care Reviewed With: patient, family  Progress: improving  Outcome Summary: discectomy L5-S1 right new admit 1441 PT OT consulted ambulated to bed from stretcher pt sleeping pain controlled with prn medication. waiting for pt to void

## 2021-03-06 NOTE — PLAN OF CARE
Goal Outcome Evaluation:  Plan of Care Reviewed With: patient, significant other  Progress: no change (initial PT eval)  Outcome Summary: PT eval complete. Edu to pt on spinal precautions, HEP, recovery, and expectations with mobility. Pt able to perform bed mobility with SBA, STS with CGA, 300 feet of ambulation with CGA, and navigated 10 steps with CGA for safety. No LOB noted or AD required. No need for skilled IPPT at this time. Recommend d/c home with assist when medically ready.

## 2021-03-06 NOTE — THERAPY EVALUATION
Patient Name: Yemi Herrera  : 1985    MRN: [id number]                              Today's Date: 3/6/2021       Admit Date: 3/5/2021    Visit Dx:     ICD-10-CM ICD-9-CM   1. Lumbar herniated disc  M51.26 722.10   2. Herniated lumbar intervertebral disc  M51.26 722.10     Patient Active Problem List   Diagnosis   • Lumbar herniated disc   • Herniated lumbar intervertebral disc     Past Medical History:   Diagnosis Date   • Lumbar herniated disc      Past Surgical History:   Procedure Laterality Date   • ROTATOR CUFF REPAIR Right      General Information     [facility] Name 21 1016          Physical Therapy Time and Intention    Document Type  discharge evaluation/summary  -     Mode of Treatment  physical therapy  -[facility] Name 21 1016          General Information    Patient Profile Reviewed  yes  -     Prior Level of Function  independent:;all household mobility;community mobility;gait;transfer;bed mobility;ADL's;driving;work  -     Existing Precautions/Restrictions  spinal;fall  -     Barriers to Rehab  none identified  -[facility] Name 21 1016          Living Environment    Lives With  significant other  -[facility] Name 21 1016          Home Main Entrance    Number of Stairs, Main Entrance  four  -     Stair Railings, Main Entrance  railing on right side (ascending)  -[facility] Name 21 1016          Stairs Within Home, Primary    Stairs, Within Home, Primary  WIll be staying at his parents house for few days. They have 2-3 SANJAY.  -     Stairs Comment, Within Home, Primary  One flight of stairs going up and one flight going down. Pt can stay on entry level if needed. Pt has walk in shower.  -[facility] Name 21 1016          Cognition    Orientation Status (Cognition)  oriented x 4  -[facility] Name 21 1016          Safety Issues, Functional Mobility    Impairments Affecting Function (Mobility)  strength  -     Comment, Safety Issues/Impairments  (Mobility)  Edu on spinal precautions. Pt able to demonstrate appropriately.  -       User Key  (r) = Recorded By, (t) = Taken By, (c) = Cosigned By    Initials Name Provider Type     Yoel Banda, PT Physical Therapist        Mobility     Row Name 03/06/21 1019          Bed Mobility    Bed Mobility  rolling right;sidelying-sit;rolling left;sit-sidelying;scooting/bridging  -     Rolling Left [place] (Bed Mobility)  supervision  -     Rolling Right [place] (Bed Mobility)  supervision  -     Scooting/Bridging [place] (Bed Mobility)  supervision  -     Sidelying-Sit [place] (Bed Mobility)  supervision  -     Sit-Sidelying [place] (Bed Mobility)  supervision  -     Assistive Device (Bed Mobility)  bed rails  -     Comment (Bed Mobility)  Edu on spinal precautions. Pt able to demonstrate appropriately. Good safety.  -     Row Name 03/06/21 1019          Transfers    Comment (Transfers)  Pt shows no LOB or requirement of AD. Good safety awareness.  -     Row Name 03/06/21 1019          Sit-Stand Transfer    Sit-Stand [place] (Transfers)  contact guard  -     Assistive Device (Sit-Stand Transfers)  other (see comments) gait belt for safety.  -     Row Name 03/06/21 1019          Gait/Stairs (Locomotion)    [place] Level (Gait)  contact guard;1 person assist  -     Assistive Device (Gait)  other (see comments) gait belt for safety.  -     Distance in Feet (Gait)  300  -     Deviations/Abnormal Patterns (Gait)  bilateral deviations;prema decreased;gait speed decreased;stride length decreased  -     Bilateral Gait Deviations  heel strike decreased  -     [place] Level (Stairs)  contact guard;1 person assist  -     Handrail Location (Stairs)  right side (ascending);left side (descending)  -     Number of Steps (Stairs)  10 ascending + 10 descending  -     Ascending Technique (Stairs)  step-over-step  -     Descending Technique (Stairs)   step-over-step  -     Comment (Gait/Stairs)  Pt shows step through pattern and slow speeds as expected s/p surgery but no LOB and good safety awareness. Navigated 10 steps without LOB. Good safety throughout.  -       User Key  (r) = Recorded By, (t) = Taken By, (c) = Cosigned By    Initials Name Provider Type     Yoel Banda, PT Physical Therapist        Obj/Interventions     Row Name 03/06/21 1022          Range of Motion Comprehensive    General Range of Motion  bilateral lower extremity ROM WFL  -JH     Row Name 03/06/21 1022          Strength Comprehensive (MMT)    General Manual Muscle Testing (MMT) Assessment  lower extremity strength deficits identified  -     Comment, General Manual Muscle Testing (MMT) Assessment  BLE grossly 4/5  -[facility] Name 03/06/21 1022          Balance    Balance Assessment  sitting static balance;sitting dynamic balance;standing static balance;standing dynamic balance  -     Static Sitting Balance  WNL;unsupported;sitting, edge of bed  -     Dynamic Sitting Balance  WFL;unsupported;sitting, edge of bed  -     Static Standing Balance  WFL;unsupported;standing  -     Dynamic Standing Balance  WFL;unsupported;standing  -     Comment, Balance  no LOB noted, no AD required  -JH     Row Name 03/06/21 1022          Sensory Assessment (Somatosensory)    Sensory Assessment (Somatosensory)  LE sensation intact;other (see comments) Pt reports no numbness/tingling.  -       User Key  (r) = Recorded By, (t) = Taken By, (c) = Cosigned By    Initials Name Provider Type     Yoel Banda, PT Physical Therapist        Goals/Plan    No documentation.       Clinical Impression     Row Name 03/06/21 1025          Pain    Additional Documentation  Pain Scale: Numbers Pre/Post-Treatment (Group)  -JH     Row Name 03/06/21 1025          Pain Scale: Numbers Pre/Post-Treatment    Pretreatment Pain Rating  0/10 - no pain  -     Posttreatment Pain Rating  3/10  -     Pain  Location - Side  Right  -     Pain Location - Orientation  lower  -     Pain Location  extremity;back  -     Pre/Posttreatment Pain Comment  Pt reports pressure in his posterior leg down to his knee. RN aware.  -     Pain Intervention(s)  Repositioned;Ambulation/increased activity  -     Row Name 03/06/21 1025          Plan of Care Review    Plan of Care Reviewed With  patient;significant other  -     Progress  no change initial PT eval  -     Outcome Summary  PT eval complete. Edu to pt on spinal precautions, HEP, recovery, and expectations with mobility. Pt able to perform bed mobility with SBA, STS with CGA, 300 feet of ambulation with CGA, and navigated 10 steps with CGA for safety. No LOB noted or AD required. No need for skilled IPPT at this time. Recommend d/c home with assist when medically ready.  -     Row Name 03/06/21 1025          Therapy Assessment/Plan (PT)    Criteria for Skilled Interventions Met (PT)  no;no problems identified which require skilled intervention;does not meet criteria for skilled intervention  -     Row Name 03/06/21 1025          Vital Signs    Pre Systolic BP Rehab  117  -     Pre Treatment Diastolic BP  67  -JH     O2 Delivery Pre Treatment  room air  -     O2 Delivery Intra Treatment  room air  -     O2 Delivery Post Treatment  room air  -     Pre Patient Position  Supine  -     Intra Patient Position  Standing  -     Post Patient Position  Supine  -[facility] Name 03/06/21 1025          Positioning and Restraints    Pre-Treatment Position  in bed  -     Post Treatment Position  bed  -     In Bed  notified nsg;supine;call light within reach;encouraged to call for assist  -       User Key  (r) = Recorded By, (t) = Taken By, (c) = Cosigned By    Initials Name Provider Type     Yoel Banda, PT Physical Therapist        Outcome Measures     Row Name 03/06/21 1028          How much help from another person do you currently need...    Turning  from your back to your side while in flat bed without using bedrails?  4  -JH     Moving from lying on back to sitting on the side of a flat bed without bedrails?  4  -JH     Moving to and from a bed to a chair (including a wheelchair)?  4  -JH     Standing up from a chair using your arms (e.g., wheelchair, bedside chair)?  4  -JH     Climbing 3-5 steps with a railing?  4  -JH     To walk in hospital room?  4  -     AM-PAC 6 Clicks Score (PT)  24  -     Row Name 03/06/21 1028          Functional Assessment    Outcome Measure Options  AM-PAC 6 Clicks Basic Mobility (PT)  -       User Key  (r) = Recorded By, (t) = Taken By, (c) = Cosigned By    Initials Name Provider Type     Yoel Banda, PT Physical Therapist        Physical Therapy Education                 Title: PT OT SLP Therapies (Done)     Topic: Physical Therapy (Done)     Point: Mobility training (Done)     Learning Progress Summary           Patient Acceptance, E,TB, VU by  at 3/6/2021 1029    Comment: edu on PT services, POC, HEP, d/c plans, and expectations with recovery                   Point: Home exercise program (Done)     Learning Progress Summary           Patient Acceptance, E,TB, VU by  at 3/6/2021 1029    Comment: edu on PT services, POC, HEP, d/c plans, and expectations with recovery                   Point: Body mechanics (Done)     Learning Progress Summary           Patient Acceptance, E,TB, VU by  at 3/6/2021 1029    Comment: edu on PT services, POC, HEP, d/c plans, and expectations with recovery                   Point: Precautions (Done)     Learning Progress Summary           Patient Acceptance, E,TB, VU by  at 3/6/2021 1029    Comment: edu on PT services, POC, HEP, d/c plans, and expectations with recovery                               User Key     Initials Effective Dates Name Provider Type [facility] 09/22/20 -  Yoel Banda, [facility] Physical Therapist PT              PT Recommendation and Plan     Plan of Care  Reviewed With: patient, significant other  Progress: no change (initial PT eval)  Outcome Summary: PT eval complete. Edu to pt on spinal precautions, HEP, recovery, and expectations with mobility. Pt able to perform bed mobility with SBA, STS with CGA, 300 feet of ambulation with CGA, and navigated 10 steps with CGA for safety. No LOB noted or AD required. No need for skilled IPPT at this time. Recommend d/c home with assist when medically ready.     Time Calculation:   PT Charges     Row Name 03/06/21 1030             Time Calculation    Start Time  0840  -      PT Received On  03/06/21  -        User Key  (r) = Recorded By, (t) = Taken By, (c) = Cosigned By    Initials Name Provider Type    Yoel Carcamo PT Physical Therapist        Therapy Charges for Today     Code Description Service Date Service Provider Modifiers Qty    [id number]  PT EVAL LOW COMPLEXITY 4 3/6/2021 Yoel Banda PT GP 1          PT G-Codes  Outcome Measure Options: AM-PAC 6 Clicks Basic Mobility (PT)  AM-PAC 6 Clicks Score (PT): 24  AM-PAC 6 Clicks Score (OT): 21    Yoel Banda PT  3/6/2021

## 2021-03-06 NOTE — PROGRESS NOTES
Continued Stay Note  [facility]     Patient Name: Yemi Herrera  MRN: [id number]  Today's Date: 3/6/2021    Admit Date: 3/5/2021    Discharge Plan     Row Name 03/06/21 1021       Plan    Plan Comments  No DC needs identified per PT/OT.    Final Discharge Disposition Code  01 - home or self-care        Discharge Codes    No documentation.       Expected Discharge Date and Time     Expected Discharge Date Expected Discharge Time    Mar 6, 2021             Leighann Davis RN

## 2021-03-06 NOTE — PLAN OF CARE
Goal Outcome Evaluation:  Plan of Care Reviewed With: patient  Progress: improving    PT HAS NOT SLEPT MUCH. AMBULATED IN HALLWAY 300FT. VOIDING WITHOUT DIFFICULTY. PRN TRAMADOL GIVEN TIMES ONE. REMAINS ON RA. MICHAELS WELL.

## 2021-03-06 NOTE — THERAPY DISCHARGE NOTE
Acute Care - Occupational Therapy Discharge  [facility]    Patient Name: Yemi Herrera  : 1985    MRN: [id number]                              Today's Date: 3/6/2021       Admit Date: 3/5/2021    Visit Dx:     ICD-10-CM ICD-9-CM   1. Lumbar herniated disc  M51.26 722.10   2. Herniated lumbar intervertebral disc  M51.26 722.10     Patient Active Problem List   Diagnosis   • Lumbar herniated disc   • Herniated lumbar intervertebral disc     Past Medical History:   Diagnosis Date   • Lumbar herniated disc      Past Surgical History:   Procedure Laterality Date   • ROTATOR CUFF REPAIR Right      General Information     Row Name 21 0839          OT Time and Intention    Document Type  discharge evaluation/summary  (Pended)   -RS     Row Name 21 0839          General Information    Patient Profile Reviewed  yes  (Pended)   -RS     Prior Level of Function  min assist:;all household mobility;bed mobility;ADL's;transfer;independent:;driving  (Pended)   -RS     Existing Precautions/Restrictions  spinal;fall  (Pended)   -RS     Barriers to Rehab  none identified  (Pended)   -RS     Row Name 21 0839          Living Environment    Lives With  significant other  (Pended)   -RS     Row Name 21 0839          Home Main Entrance    Number of Stairs, Main Entrance  four  (Pended)   -RS     Stair Railings, Main Entrance  railing on right side (ascending)  (Pended)   -RS     Row Name 21 0839          Stairs Within Home, Primary    Stairs, Within Home, Primary  Will be going home with parents for the first few days.  (Pended)   -RS     Number of Stairs, Within Home, Primary  other (see comments)  (Pended)   -RS     Stair Railings, Within Home, Primary  other (see comments)  (Pended)   -RS     Stairs Comment, Within Home, Primary  One flight of stairs going up and one flight going down. Pt can stay on entry level if needed. Pt has walk in shower.  (Pended)   -     Row Name 21 0839           Cognition    Orientation Status (Cognition)  oriented x 4  (Pended)   -RS     Row Name 03/06/21 0839          Safety Issues, Functional Mobility    Safety Issues Affecting Function (Mobility)  safety precaution awareness;safety precautions follow-through/compliance  (Pended)   -RS     Impairments Affecting Function (Mobility)  strength;pain;range of motion (ROM)  (Pended)   -RS     Comment, Safety Issues/Impairments (Mobility)  OT educated pt on spinal precautions.  (Pended)   -RS       User Key  (r) = Recorded By, (t) = Taken By, (c) = Cosigned By    Initials Name Provider Type    RS Torres Chavez OT Student        Mobility/ADL's     Row Name 03/06/21 0850          Bed Mobility    Bed Mobility  rolling left;scooting/bridging;supine-sit;sit-supine  (Pended)   -RS     Rolling Left [place] (Bed Mobility)  supervision  (Pended)   -RS     Scooting/Bridging [place] (Bed Mobility)  supervision  (Pended)   -RS     Supine-Sit [place] (Bed Mobility)  supervision  (Pended)   -RS     Sit-Supine [place] (Bed Mobility)  supervision  (Pended)   -RS     Assistive Device (Bed Mobility)  bed rails;head of bed elevated  (Pended)   -RS     Comment (Bed Mobility)  OT educated pt on log rolling technique for bed mobility to maintain spinal precautions.  (Pended)   -RS     Row Name 03/06/21 0850          Transfers    Transfers  sit-stand transfer;toilet transfer  (Pended)   -RS     Comment (Transfers)  OT educate pt on safe car transfers to maintain spinal precautions.  (Pended)   -RS     Sit-Stand [place] (Transfers)  supervision  (Pended)   -RS     [place] Level (Toilet Transfer)  standby assist  (Pended)   -RS     Assistive Device (Toilet Transfer)  --  (Pended)  Urinal  -RS     Row Name 03/06/21 0850          Toilet Transfer    Type (Toilet Transfer)  lateral  (Pended)   -RS     Row Name 03/06/21 0850          Functional Mobility    Functional Mobility- Ind. Level  supervision required   (Pended)   -RS     Functional Mobility-Distance (Feet)  15  (Pended)   -RS     Row Name 03/06/21 0850          Activities of Daily Living    BADL Assessment/Intervention  bathing;upper body dressing;lower body dressing;toileting  (Pended)   -RS     Row Name 03/06/21 0850          Bathing Assessment/Intervention    Comment (Bathing)  OT issued and educated pt on incorporating LHS into bathing to maintain spinal precautions.  (Pended)   -RS     Row Name 03/06/21 0850          Upper Body Dressing Assessment/Training    Sac Level (Upper Body Dressing)  doff;pajama/robe;don;pull-over garment;supervision  (Pended)   -RS     Position (Upper Body Dressing)  edge of bed sitting  (Pended)   -RS     Row Name 03/06/21 0850          Lower Body Dressing Assessment/Training    Sac Level (Lower Body Dressing)  don;socks;shoes/slippers;pants/bottoms;supervision  (Pended)   -RS     Assistive Devices (Lower Body Dressing)  reacher  (Pended)   -RS     Position (Lower Body Dressing)  edge of bed sitting  (Pended)   -RS     Comment (Lower Body Dressing)  Pt was issued and educated on incorporating reacher into LBD if needed to maintain LBD.  (Pended)   -RS     Row Name 03/06/21 0850          Toileting Assessment/Training    Sac Level (Toileting)  adjust/manage clothing;perform perineal hygiene;standby assist  (Pended)   -RS     Assistive Devices (Toileting)  urinal  (Pended)   -RS     Position (Toileting)  unsupported standing  (Pended)   -RS     Comment (Toileting)  Ot educated pt on safe perineal hygiene techniques to maintain spinal precautions.  (Pended)   -RS       User Key  (r) = Recorded By, (t) = Taken By, (c) = Cosigned By    Initials Name Provider Type    RS Torres Chavez OT Student        Obj/Interventions     Row Name 03/06/21 0857          Sensory Assessment (Somatosensory)    Sensory Assessment (Somatosensory)  sensation intact  (Pended)   -     Row Name 03/06/21 0857          Vision  Assessment/Intervention    Visual Impairment/Limitations  WFL  (Pended)   -     Row Name 03/06/21 0857          Range of Motion Comprehensive    General Range of Motion  no range of motion deficits identified  (Pended)   -RS     Comment, General Range of Motion  BUE WFL  (Pended)   -     Row Name 03/06/21 0857          Strength Comprehensive (MMT)    Comment, General Manual Muscle Testing (MMT) Assessment  MMT not tested d/t spinal precautions.  (Pended)   -     Row Name 03/06/21 0857          Balance    Balance Assessment  sitting static balance;sitting dynamic balance;standing static balance;standing dynamic balance  (Pended)   -RS     Static Sitting Balance  WNL  (Pended)   -RS     Dynamic Sitting Balance  WFL  (Pended)   -RS     Static Standing Balance  WFL  (Pended)   -RS     Dynamic Standing Balance  WFL  (Pended)   -RS       User Key  (r) = Recorded By, (t) = Taken By, (c) = Cosigned By    Initials Name Provider Type    RS Torres Chavez OT Student        Goals/Plan     Row Name 03/06/21 0908          Transfer Goal 1 (OT)    Activity/Assistive Device (Transfer Goal 1, OT)  sit-to-stand/stand-to-sit;toilet  (Pended)   -RS     [place] Level/Cues Needed (Transfer Goal 1, OT)  supervision required  (Pended)   -RS     Time Frame (Transfer Goal 1, OT)  by discharge  (Pended)   -RS     Progress/Outcome (Transfer Goal 1, OT)  goal met  (Pended)   -     Row Name 03/06/21 0908          Dressing Goal 1 (OT)    Activity/Device (Dressing Goal 1, OT)  lower body dressing  (Pended)   -RS     [place]/Cues Needed (Dressing Goal 1, OT)  supervision required  (Pended)   -RS     Time Frame (Dressing Goal 1, OT)  by discharge  (Pended)   -RS     Progress/Outcome (Dressing Goal 1, OT)  goal met  (Pended)   -     Row Name 03/06/21 0908          Toileting Goal 1 (OT)    Activity/Device (Toileting Goal 1, OT)  adjust/manage clothing;perform perineal hygiene  (Pended)   -RS     [place] Level/Cues Needed  (Toileting Goal 1, OT)  standby assist  (Pended)   -RS     Time Frame (Toileting Goal 1, OT)  by discharge  (Pended)   -RS     Progress/Outcome (Toileting Goal 1, OT)  goal met  (Pended)   -RS     Row Name 03/06/21 0908          Therapy Assessment/Plan (OT)    Planned Therapy Interventions (OT)  activity tolerance training;adaptive equipment training;BADL retraining;occupation/activity based interventions;patient/caregiver education/training;transfer/mobility retraining  (Pended)   -RS       User Key  (r) = Recorded By, (t) = Taken By, (c) = Cosigned By    Initials Name Provider Type    RS Torres Chavez OT Student        Clinical Impression     Row Name 03/06/21 0832          Pain Assessment    Additional Documentation  Pain Scale: Numbers Pre/Post-Treatment (Group)  (Pended)   -RS     Row Name 03/06/21 0869          Pain Scale: Numbers Pre/Post-Treatment    Pretreatment Pain Rating  0/10 - no pain  (Pended)   -RS     Posttreatment Pain Rating  3/10  (Pended)   -RS     Pain Location - Side  Right  (Pended)   -RS     Pain Location - Orientation  lower  (Pended)   -RS     Pain Location  back;extremity  (Pended)   -RS     Pain Intervention(s)  Ambulation/increased activity;Repositioned  (Pended)   -RS     Row Name 03/06/21 0822          Plan of Care Review    Plan of Care Reviewed With  patient;significant other  (Pended)   -RS     Progress  improving  (Pended)   -RS     Outcome Summary  Pt A&Ox4. OT educated pt on spinal precautions. OT educated pt on log rolling technique for bed mobility, and safe car transfers to maintain spinal precautions. Pt performed bed mobility and transfers with supervision. OT educated pt on AE incorporation into ADLs to maintain spinal precautions. OT educated pt on safe perineal care techniques to maintain spinal precautions during toileting. Pt performed UBD and LBD with supervision. Pt toileted with SBA. BSC not needed. Recommend d/c to home with assist.  (Pended)   -RS     Row Name  03/06/21 0858          Therapy Assessment/Plan (OT)    Rehab Potential (OT)  good, to achieve stated therapy goals  (Pended)   -RS     Criteria for Skilled Therapeutic Interventions Met (OT)  yes  (Pended)   -RS     Therapy Frequency (OT)  evaluation only  (Pended)   -RS     Row Name 03/06/21 0858          Therapy Plan Review/Discharge Plan (OT)    Anticipated Discharge Disposition (OT)  home with assist  (Pended)   -RS     Row Name 03/06/21 0858          Vital Signs    Pre Systolic BP Rehab  --  (Pended)  VSS  -RS     O2 Delivery Pre Treatment  room air  (Pended)   -RS     O2 Delivery Intra Treatment  room air  (Pended)   -RS     O2 Delivery Post Treatment  room air  (Pended)   -RS     Pre Patient Position  Supine  (Pended)   -RS     Intra Patient Position  Standing  (Pended)   -RS     Post Patient Position  Supine  (Pended)   -RS     Row Name 03/06/21 0858          Positioning and Restraints    Pre-Treatment Position  in bed  (Pended)   -RS     Post Treatment Position  bed  (Pended)   -RS     In Bed  notified nsg;supine;call light within reach;encouraged to call for assist  (Pended)  Exit alarm not on upon OT entry.  -RS       User Key  (r) = Recorded By, (t) = Taken By, (c) = Cosigned By    Initials Name Provider Type    RS Torres Chavez OT Student        Outcome Measures     Row Name 03/06/21 0909          How much help from another is currently needed...    Putting on and taking off regular lower body clothing?  3  (Pended)   -RS     Bathing (including washing, rinsing, and drying)  3  (Pended)   -RS     Toileting (which includes using toilet bed pan or urinal)  3  (Pended)   -RS     Putting on and taking off regular upper body clothing  4  (Pended)   -RS     Taking care of personal grooming (such as brushing teeth)  4  (Pended)   -RS     Eating meals  4  (Pended)   -RS     AM-PAC 6 Clicks Score (OT)  21  (Pended)   -RS     Row Name 03/06/21 0909          Functional Assessment    Outcome Measure Options   AM-PAC 6 Clicks Daily Activity (OT)  (Pended)   -       User Key  (r) = Recorded By, (t) = Taken By, (c) = Cosigned By    Initials Name Provider Type    RS Torres Chavez OT Student        Occupational Therapy Education                 Title: PT OT SLP Therapies (In Progress)     Topic: Occupational Therapy (In Progress)     Point: ADL training (Done)     Description:   Instruct learner(s) on proper safety adaptation and remediation techniques during self care or transfers.   Instruct in proper use of assistive devices.              Learning Progress Summary           Patient Acceptance, E,TB,D, VU,DU by  at 3/6/2021 0910   Significant Other Acceptance, E,TB,D, VU,DU by  at 3/6/2021 0910                   Point: Home exercise program (Not Started)     Description:   Instruct learner(s) on appropriate technique for monitoring, assisting and/or progressing therapeutic exercises/activities.              Learner Progress:  Not documented in this visit.          Point: Precautions (Done)     Description:   Instruct learner(s) on prescribed precautions during self-care and functional transfers.              Learning Progress Summary           Patient Acceptance, E,TB,D, VU,DU by  at 3/6/2021 0910   Significant Other Acceptance, E,TB,D, VU,DU by  at 3/6/2021 0910                   Point: Body mechanics (Done)     Description:   Instruct learner(s) on proper positioning and spine alignment during self-care, functional mobility activities and/or exercises.              Learning Progress Summary           Patient Acceptance, E,TB,D, VU,DU by  at 3/6/2021 0910   Significant Other Acceptance, E,TB,D, VU,DU by  at 3/6/2021 0910                               User Key     Initials Effective Dates Name Provider Type [facility] 12/04/20 -  Torres Chavez OT Student OT              OT Recommendation and Plan  Retired Outcome Summary/Treatment Plan (OT)  Anticipated Discharge Disposition (OT): (P) home with  assist  Planned Therapy Interventions (OT): (P) activity tolerance training, adaptive equipment training, BADL retraining, occupation/activity based interventions, patient/caregiver education/training, transfer/mobility retraining  Therapy Frequency (OT): (P) evaluation only  Plan of Care Review  Plan of Care Reviewed With: (P) patient, significant other  Progress: (P) improving  Outcome Summary: (P) Pt A&Ox4. OT educated pt on spinal precautions. OT educated pt on log rolling technique for bed mobility, and safe car transfers to maintain spinal precautions. Pt performed bed mobility and transfers with supervision. OT educated pt on AE incorporation into ADLs to maintain spinal precautions. OT educated pt on safe perineal care techniques to maintain spinal precautions during toileting. Pt performed UBD and LBD with supervision. Pt toileted with SBA. BSC not needed. Recommend d/c to home with assist.  Plan of Care Reviewed With: (P) patient, significant other  Outcome Summary: (P) Pt A&Ox4. OT educated pt on spinal precautions. OT educated pt on log rolling technique for bed mobility, and safe car transfers to maintain spinal precautions. Pt performed bed mobility and transfers with supervision. OT educated pt on AE incorporation into ADLs to maintain spinal precautions. OT educated pt on safe perineal care techniques to maintain spinal precautions during toileting. Pt performed UBD and LBD with supervision. Pt toileted with SBA. BSC not needed. Recommend d/c to home with assist.     Time Calculation:   Time Calculation- OT     Row Name 03/06/21 0802             Time Calculation- OT    OT Start Time  0802  (Pended)   -RS      OT Received On  03/06/21  (Pended)   -         Timed Charges    18789 - OT Self Care/Mgmt Minutes  12  (Pended)   -RS        User Key  (r) = Recorded By, (t) = Taken By, (c) = Cosigned By    Initials Name Provider Type    RS Torres Chavez OT Student        Therapy Charges for Today     Code  Description Service Date Service Provider Modifiers Qty    86172254778 HC OT SELF CARE/MGMT/TRAIN EA 15 MIN 3/6/2021 Torres Chavez GO 1    31656670075 HC OT EVAL LOW COMPLEXITY 4 3/6/2021 Torres Chavez GO 1               Torres Chavez  3/6/2021

## 2021-03-09 ENCOUNTER — TELEPHONE (OUTPATIENT)
Dept: NEUROSURGERY | Facility: CLINIC | Age: 36
End: 2021-03-09

## 2021-03-09 NOTE — TELEPHONE ENCOUNTER
Provider: Jose   Caller: patient  Time of call:   12:24  Phone #:  [phone]  Surgery:  Lumbar discectomy  Surgery Date:  03/05/21  Last visit:   same  Next visit:     MATT:         Reason for call:     Patient wants to know the best way to get his short term disability form to us? Via fax/E-mail?

## 2021-03-15 ENCOUNTER — TELEPHONE (OUTPATIENT)
Dept: NEUROSURGERY | Facility: CLINIC | Age: 36
End: 2021-03-15

## 2021-03-15 DIAGNOSIS — M51.26 HERNIATED LUMBAR INTERVERTEBRAL DISC: Primary | ICD-10-CM

## 2021-03-15 RX ORDER — GABAPENTIN 100 MG/1
CAPSULE ORAL
Qty: 30 CAPSULE | Refills: 0 | Status: SHIPPED | OUTPATIENT
Start: 2021-03-15 | End: 2021-04-19

## 2021-03-15 NOTE — TELEPHONE ENCOUNTER
Regarding: FW: Non-Urgent Medical Question  Contact: [phone]    ----- Message -----  From: Nataly Valdivia MA  Sent: 3/15/2021   5:08 PM EDT  To: e Neurosurg [facility] Clinical Pool, #  Subject: Non-Urgent Medical Question                      ----- Message from Nataly Valdivia MA sent at 3/15/2021  5:08 PM EDT -----       ----- Message from Yemi Herrera to Tristan Garcia MD sent at 3/15/2021  1:51 PM -----   Good afternoon Dr. Garcia. I have a concern about my recovery I wanted to ask you about. My back continues to feel better from the surgery each day, but I am still having pain in my right hip/leg down to the back of my knee. I've followed all of the post surgery directions as well. I didn't know if this was normal or something you should be notified about. I look forward to your response. Thank you.

## 2021-04-19 ENCOUNTER — OFFICE VISIT (OUTPATIENT)
Dept: NEUROSURGERY | Facility: CLINIC | Age: 36
End: 2021-04-19

## 2021-04-19 VITALS
TEMPERATURE: 98.5 F | HEART RATE: 57 BPM | WEIGHT: 260 LBS | SYSTOLIC BLOOD PRESSURE: 122 MMHG | HEIGHT: 72 IN | OXYGEN SATURATION: 97 % | DIASTOLIC BLOOD PRESSURE: 84 MMHG | RESPIRATION RATE: 20 BRPM | BODY MASS INDEX: 35.21 KG/M2

## 2021-04-19 DIAGNOSIS — M51.26 LUMBAR HERNIATED DISC: Primary | ICD-10-CM

## 2021-04-19 PROCEDURE — 99024 POSTOP FOLLOW-UP VISIT: CPT | Performed by: PHYSICIAN ASSISTANT

## 2021-04-19 RX ORDER — TRAMADOL HYDROCHLORIDE 50 MG/1
50 TABLET ORAL EVERY 8 HOURS PRN
Qty: 30 TABLET | Refills: 0 | Status: SHIPPED | OUTPATIENT
Start: 2021-04-19

## 2021-04-19 NOTE — PROGRESS NOTES
"Yemi Gunter [facility]  1985 04/19/2021  [id number]    CC: Follow-up after surgery    HPI:  S/P discectomy L5-S1, right on 3/5/2021.  The patient reports that he has been slowly increasing his activities and has been very careful following his restrictions.  He has had resolution of his leg pain and is very pleased with his surgical outcome.  He is inquiring about returning to work, he is in a supervisory role as a paramedic  and he is able to be very careful with his activities at work.    Past Medical History:   Diagnosis Date   • Lumbar herniated disc        No Known Allergies      Current Outpatient Medications:   •  traMADol (ULTRAM) 50 MG tablet, Take 1 tablet by mouth Every 4 (Four) Hours As Needed for Moderate Pain ., Disp: 60 tablet, Rfl: 0  No current facility-administered medications for this visit.    Facility-Administered Medications Ordered in Other Visits:   •  mupirocin (BACTROBAN) 2 % nasal ointment, , Nasal, Once, Tristan Garcia MD      PE:  /84   Pulse 57   Temp 98.5 °F (36.9 °C) (Temporal)   Resp 20   Ht 182.9 cm (72\")   Wt 118 kg (260 lb)   SpO2 97%   BMI 35.26 kg/m²   Heart- RRR  Lungs- no wheezing, normal expansion    Wound-healing well.    Neurologic Exam   Patellar reflexes intact, Achilles reflexes absent    MDM   Activities and restrictions were discussed, he will continue with his Teofilo exercises and core training along with weight loss.  Wound care was discussed with the patient.  I have completed a return to work form.  We will have a follow-up appointment in 6 weeks.    CATHERINE Tao  Answers for HPI/ROS submitted by the patient on 4/12/2021  What is the primary reason for your visit?: Other  Please describe your symptoms.: No symptoms. Follow up from discectomy surgery.  Have you had these symptoms before?: Yes  How long have you been having these symptoms?: Greater than 2 weeks      "

## 2021-06-01 ENCOUNTER — OFFICE VISIT (OUTPATIENT)
Dept: NEUROSURGERY | Facility: CLINIC | Age: 36
End: 2021-06-01

## 2021-06-01 VITALS
HEART RATE: 58 BPM | DIASTOLIC BLOOD PRESSURE: 88 MMHG | BODY MASS INDEX: 34.92 KG/M2 | RESPIRATION RATE: 18 BRPM | OXYGEN SATURATION: 100 % | TEMPERATURE: 97.3 F | WEIGHT: 257.8 LBS | HEIGHT: 72 IN | SYSTOLIC BLOOD PRESSURE: 142 MMHG

## 2021-06-01 DIAGNOSIS — M51.26 LUMBAR HERNIATED DISC: Primary | ICD-10-CM

## 2021-06-01 PROBLEM — M54.50 CHRONIC LOW BACK PAIN: Status: ACTIVE | Noted: 2021-06-01

## 2021-06-01 PROBLEM — G89.29 CHRONIC LOW BACK PAIN: Status: ACTIVE | Noted: 2021-06-01

## 2021-06-01 PROCEDURE — 99024 POSTOP FOLLOW-UP VISIT: CPT | Performed by: PHYSICIAN ASSISTANT

## 2021-06-01 NOTE — PROGRESS NOTES
Yemi Gunter [place]  1985 06/01/2021  [id number]    CC: Mild incisional discomfort, occasional right lateral thigh discomfort    HPI:  S/P lumbar discectomy, L5-S1, right, 3/5/2021.  The patient has been back to work with limited activities since 4/19/2021.  He has been tolerating his work activities as well as home activities without much difficulties.  He is continue with the stretching exercises.    Past Medical History:   Diagnosis Date   • Lumbar herniated disc        No Known Allergies      Current Outpatient Medications:   •  traMADol (ULTRAM) 50 MG tablet, Take 1 tablet by mouth Every 8 (Eight) Hours As Needed for Moderate Pain ., Disp: 30 tablet, Rfl: 0  No current facility-administered medications for this visit.    Facility-Administered Medications Ordered in Other Visits:   •  mupirocin (BACTROBAN) 2 % nasal ointment, , Nasal, Once, Tristan Garcia MD    Review of Systems   Constitutional: Negative for activity change, appetite change, chills, diaphoresis, fatigue, fever and unexpected weight change.   HENT: Negative for congestion, dental problem, drooling, ear discharge, ear pain, facial swelling, hearing loss, mouth sores, nosebleeds, postnasal drip, rhinorrhea, sinus pressure, sneezing, sore throat, tinnitus, trouble swallowing and voice change.    Eyes: Negative for photophobia, pain, discharge, redness, itching and visual disturbance.   Respiratory: Negative for apnea, cough, choking, chest tightness, shortness of breath, wheezing and stridor.    Cardiovascular: Negative for chest pain, palpitations and leg swelling.   Gastrointestinal: Negative for abdominal distention, abdominal pain, anal bleeding, blood in stool, constipation, diarrhea, nausea, rectal pain and vomiting.   Endocrine: Negative for cold intolerance, heat intolerance, polydipsia, polyphagia and polyuria.   Genitourinary: Negative for decreased urine volume, difficulty urinating, dysuria, enuresis, flank pain, frequency,  "genital sores, hematuria and urgency.   Musculoskeletal: Positive for back pain. Negative for arthralgias, gait problem, joint swelling, myalgias, neck pain and neck stiffness.   Skin: Negative for color change, pallor, rash and wound.   Allergic/Immunologic: Negative for environmental allergies, food allergies and immunocompromised state.   Neurological: Negative for dizziness, tremors, seizures, syncope, facial asymmetry, speech difficulty, weakness, light-headedness, numbness and headaches.   Hematological: Negative for adenopathy. Does not bruise/bleed easily.   Psychiatric/Behavioral: Negative for agitation, behavioral problems, confusion, decreased concentration, dysphoric mood, hallucinations, self-injury, sleep disturbance and suicidal ideas. The patient is not nervous/anxious and is not hyperactive.    All other systems reviewed and are negative.        PE:  /88   Pulse 58   Temp 97.3 °F (36.3 °C)   Resp 18   Ht 182.9 cm (72\")   Wt 117 kg (257 lb 12.8 oz)   SpO2 100%   BMI 34.96 kg/m²   Heart- RRR  Lungs- no wheezing, normal expansion    Wound-well-healed    Neurologic Exam   Normal gait    MDM   Activities and restrictions were discussed.  The patient will continue with his core exercise program, maintain proper posture and proper body mechanics with lifting at work.  He will continue on weight loss.  I am providing him a return to work slip without restrictions.  We did discuss the prior findings on his MRI scan of degenerative changes at L4-5 and L5-S1.  Follow-up will be on an as-needed basis.    It was a pleasure providing neurosurgical care.    Onelia Cohen, PAC    "

## 2025-01-08 NOTE — PROGRESS NOTES
Problem: Chronic Conditions and Co-morbidities  Goal: Patient's chronic conditions and co-morbidity symptoms are monitored and maintained or improved  Outcome: Progressing     Problem: Discharge Planning  Goal: Discharge to home or other facility with appropriate resources  Outcome: Progressing     Problem: Pain  Goal: Verbalizes/displays adequate comfort level or baseline comfort level  Outcome: Progressing     Problem: Safety - Adult  Goal: Free from fall injury  Outcome: Progressing     Problem: Nutrition Deficit:  Goal: Optimize nutritional status  Outcome: Progressing      Yemi SPENCER [facility]  1985  [id number]      Chief Complaint   Patient presents with   • Back Pain   • Leg Pain     RLE       HISTORY OF PRESENT ILLNESS: This is a 35-year-old male seen with approximately 5-week history of pain in his back which radiates to his right leg subsequent playing basketball i on January 20, 2001.  He has shown no improvement since that time.  The pain is worse with bending and squatting.  He has a genetic predisposition for degenerative disc disease.  Lumbar MRI has been performed.    History reviewed. No pertinent past medical history.    Past Surgical History:   Procedure Laterality Date   • ROTATOR CUFF REPAIR Right 1997       Family History   Problem Relation Age of Onset   • Arthritis Mother        Social History     Socioeconomic History   • Marital status:      Spouse name: Not on file   • Number of children: Not on file   • Years of education: Not on file   • Highest education level: Not on file   Tobacco Use   • Smoking status: Never Smoker   • Smokeless tobacco: Never Used   Substance and Sexual Activity   • Alcohol use: Yes     Alcohol/week: 10.0 - 15.0 standard drinks     Types: 10 - 15 Cans of beer per week   • Drug use: Never   • Sexual activity: Defer       No Known Allergies      Current Outpatient Medications:   •  cyclobenzaprine (FLEXERIL) 10 MG tablet, Take 10 mg by mouth 2 (Two) Times a Day As Needed for Muscle Spasms., Disp: , Rfl:     Review of Systems   Constitutional: Negative for activity change, appetite change, chills, diaphoresis, fatigue, fever and unexpected weight change.   HENT: Negative for congestion, dental problem, drooling, ear discharge, ear pain, facial swelling, hearing loss, mouth sores, nosebleeds, postnasal drip, rhinorrhea, sinus pressure, sneezing, sore throat, tinnitus, trouble swallowing and voice change.    Eyes: Negative for photophobia, pain, discharge, redness, itching and visual disturbance.   Respiratory: Negative for apnea,  "cough, choking, chest tightness, shortness of breath, wheezing and stridor.    Cardiovascular: Negative for chest pain, palpitations and leg swelling.   Gastrointestinal: Negative for abdominal distention, abdominal pain, anal bleeding, blood in stool, constipation, diarrhea, nausea, rectal pain and vomiting.   Endocrine: Negative for cold intolerance, heat intolerance, polydipsia, polyphagia and polyuria.   Genitourinary: Negative for decreased urine volume, difficulty urinating, dysuria, enuresis, flank pain, frequency, genital sores, hematuria and urgency.   Musculoskeletal: Positive for back pain. Negative for arthralgias, gait problem, joint swelling, myalgias, neck pain and neck stiffness.   Skin: Negative for color change, pallor, rash and wound.   Allergic/Immunologic: Negative for environmental allergies, food allergies and immunocompromised state.   Neurological: Negative for dizziness, tremors, seizures, syncope, facial asymmetry, speech difficulty, weakness, light-headedness, numbness and headaches.   Hematological: Negative for adenopathy. Does not bruise/bleed easily.   Psychiatric/Behavioral: Negative for agitation, behavioral problems, confusion, decreased concentration, dysphoric mood, hallucinations, self-injury, sleep disturbance and suicidal ideas. The patient is not nervous/anxious and is not hyperactive.    All other systems reviewed and are negative.      Vitals:    01/26/21 1224   BP: (!) 136/106   BP Location: Right arm   Patient Position: Sitting   Cuff Size: Adult   Temp: 98.5 °F (36.9 °C)   TempSrc: Infrared   Weight: 120 kg (264 lb)   Height: 182.9 cm (72\")       Neurological Examination:    Mental status/speech: The patient is alert and oriented.  Speech is clear without aphysia or dysarthria.  No overt cognitive deficits.    Cranial nerve examination:    Olfaction: Smell is intact.  Vision: Vision is intact without visual field abnormalities.  Funduscopic examination is normal.  No " pupillary irregularity.  Ocular motor examination: The extraocular muscles are intact.  There is no diplopia.  The pupil is round and reactive to both light and accommodation.  There is no nystagmus.  Facial movement/sensation: There is no facial weakness.  Sensation is intact in the first, second, and third divisions of the trigeminal nerve.  The corneal reflex is intact.  Auditory: Hearing is intact to finger rub bilaterally.  Cranial nerves IX, X, XI, XII: Phonation is normal.  No dysphagia.  Tongue is protruded in the midline without atrophy.  The gag reflex is intact.  Shoulder shrug is normal.    Musculoligamentous ligamentous examination: BMI 35.8, weight 264.  Limited range of motion lumbar spine.  Positive straight leg raise on the right side.  No weakness, sensory loss or reflex asymmetry.  Gait is normal.  No Babinski Aleshia or clonus    Medical Decision Making:     Diagnostic Data Set: Lumbar MRI data set show the presence of degenerative disc disease L4-5 and L5-S1.  At L5-S1 he has disc protrusion deviating to the right providing anatomical/clinical correlation.      Assessment: Symptomatic disc herniation L5-S1, right          Recommendations: I have sent him to physical therapy, given him prescription of Relafen 750 mg twice daily and Robaxin-750 milligrams at night and he will call me the next 3-4 weeks.  At that time if he is not better either epidural steroid injection or disc excision would be indicated.  He is unable to work at this time.  I will keep you informed and thank you for allow me to see him        I greatly appreciate the opportunity to see and evaluate this individual.  If you have questions or concerns regarding issues that I may have overlooked please call me at any time: [phone].  Zachary Garcia M.D.  [facility]  [address] [place] [address].  [facility]  [id number]

## (undated) DEVICE — PENCL ROCKRSWCH MEGADYNE W/HOLSTR 10FT SS

## (undated) DEVICE — 3M™ STERI-STRIP™ REINFORCED ADHESIVE SKIN CLOSURES, R1547, 1/2 IN X 4 IN (12 MM X 100 MM), 6 STRIPS/ENVELOPE: Brand: 3M™ STERI-STRIP™

## (undated) DEVICE — ADHS LIQ MASTISOL 2/3ML

## (undated) DEVICE — 3M™ STERI-DRAPE™ INSTRUMENT POUCH 1018: Brand: STERI-DRAPE™

## (undated) DEVICE — 3M™ MEDIPORE™ H SOFT CLOTH SURGICAL TAPE, 2863, 3 IN X 10 YD, 12/CASE: Brand: 3M™ MEDIPORE™

## (undated) DEVICE — ELECTRD BLD EZ CLN STD 2.5IN

## (undated) DEVICE — ANTIBACTERIAL UNDYED BRAIDED (POLYGLACTIN 910), SYNTHETIC ABSORBABLE SUTURE: Brand: COATED VICRYL

## (undated) DEVICE — HDRST INTUB GENTLETOUCH SLOT 7IN RT

## (undated) DEVICE — STRAP POSTN KN/BDY FM 5X72IN DISP

## (undated) DEVICE — SPNG GZ WOVN 4X4IN 12PLY 10/BX STRL

## (undated) DEVICE — ELECTRD BLD EZ CLN STD 4IN

## (undated) DEVICE — 3M™ WARMING BLANKET, UPPER BODY, 10 PER CASE, 42268: Brand: BAIR HUGGER™

## (undated) DEVICE — PATIENT RETURN ELECTRODE, SINGLE-USE, CONTACT QUALITY MONITORING, ADULT, WITH 9FT CORD, FOR PATIENTS WEIGING OVER 33LBS. (15KG): Brand: MEGADYNE

## (undated) DEVICE — NEEDLE, QUINCKE, 18GX3.5": Brand: MEDLINE

## (undated) DEVICE — GLV SURG PREMIERPRO MIC LTX PF SZ6.5 BRN

## (undated) DEVICE — PK NEURO DISC 10

## (undated) DEVICE — GLV SURG PREMIERPRO MIC LTX PF SZ7 BRN

## (undated) DEVICE — GLV SURG PREMIERPRO MIC LTX PF SZ7.5 BRN

## (undated) DEVICE — TOOL 14MH30 LEGEND 14CM 3MM: Brand: MIDAS REX ™

## (undated) DEVICE — ACCY PA700 LUBRICANT DIFFUSER MR7 4 PACK: Brand: MIDAS REX